# Patient Record
Sex: FEMALE | Race: WHITE | NOT HISPANIC OR LATINO | Employment: FULL TIME | URBAN - METROPOLITAN AREA
[De-identification: names, ages, dates, MRNs, and addresses within clinical notes are randomized per-mention and may not be internally consistent; named-entity substitution may affect disease eponyms.]

---

## 2017-03-06 ENCOUNTER — OFFICE VISIT (OUTPATIENT)
Dept: FAMILY MEDICINE | Facility: CLINIC | Age: 42
End: 2017-03-06
Payer: COMMERCIAL

## 2017-03-06 VITALS
HEIGHT: 64 IN | TEMPERATURE: 98.2 F | HEART RATE: 109 BPM | OXYGEN SATURATION: 96 % | DIASTOLIC BLOOD PRESSURE: 68 MMHG | WEIGHT: 104.8 LBS | SYSTOLIC BLOOD PRESSURE: 118 MMHG | BODY MASS INDEX: 17.89 KG/M2

## 2017-03-06 DIAGNOSIS — G43.829 MENSTRUAL MIGRAINE WITHOUT STATUS MIGRAINOSUS, NOT INTRACTABLE: ICD-10-CM

## 2017-03-06 DIAGNOSIS — M99.08 SOMATIC DYSFUNCTION OF RIB CAGE REGION: ICD-10-CM

## 2017-03-06 DIAGNOSIS — M62.830 BACK MUSCLE SPASM: ICD-10-CM

## 2017-03-06 DIAGNOSIS — J18.9 PNEUMONIA OF BOTH LUNGS DUE TO INFECTIOUS ORGANISM, UNSPECIFIED PART OF LUNG: Primary | ICD-10-CM

## 2017-03-06 PROCEDURE — 99214 OFFICE O/P EST MOD 30 MIN: CPT | Performed by: FAMILY MEDICINE

## 2017-03-06 PROCEDURE — 98925 OSTEOPATH MANJ 1-2 REGIONS: CPT | Performed by: FAMILY MEDICINE

## 2017-03-06 RX ORDER — AZITHROMYCIN 250 MG/1
TABLET, FILM COATED ORAL
Qty: 6 TABLET | Refills: 0 | Status: SHIPPED | OUTPATIENT
Start: 2017-03-06 | End: 2023-02-23

## 2017-03-06 RX ORDER — NORGESTIMATE AND ETHINYL ESTRADIOL 7DAYSX3 LO
1 KIT ORAL DAILY
COMMUNITY
End: 2023-02-23

## 2017-03-06 RX ORDER — FLUCONAZOLE 150 MG/1
150 TABLET ORAL ONCE
Qty: 1 TABLET | Refills: 0 | Status: SHIPPED | OUTPATIENT
Start: 2017-03-06 | End: 2017-03-06

## 2017-03-06 RX ORDER — DEXTROAMPHETAMINE SACCHARATE, AMPHETAMINE ASPARTATE, DEXTROAMPHETAMINE SULFATE AND AMPHETAMINE SULFATE 2.5; 2.5; 2.5; 2.5 MG/1; MG/1; MG/1; MG/1
10 TABLET ORAL DAILY
COMMUNITY

## 2017-03-06 RX ORDER — CYCLOBENZAPRINE HCL 5 MG
5 TABLET ORAL 3 TIMES DAILY PRN
Qty: 20 TABLET | Refills: 1 | Status: SHIPPED | OUTPATIENT
Start: 2017-03-06 | End: 2023-02-23

## 2017-03-06 NOTE — PROGRESS NOTES
SUBJECTIVE:                                                    Emily Armijo is a 42 year old female who presents to clinic today for the following health issues:    ENT Symptoms             Symptoms: cc Present Absent Comment   Fever/Chills   x Low grade at first   Fatigue  x     Muscle Aches x   Pulled muscle the other day   Eye Irritation   x    Sneezing   x    Nasal Favian/Drg  x  drainage   Sinus Pressure/Pain  x     Loss of smell   x    Dental pain   x    Sore Throat   x    Swollen Glands   x    Ear Pain/Fullness   x    Cough x   productive   Wheeze   x    Chest Pain  x  Pulled muscle   Shortness of breath  x     Rash   x    Other  x  headache     Symptom duration:  4 weeks   Symptom severity:  mild- moderate   Treatments tried:  Mucinex at night, tea and water, motrin   Contacts:  , sister had pneumonia     Additional Comments: Head cold for a week which she got over, now respiratory symptoms.   She was coughing and pulled a muscle yesterday. The pain from the pulled muscle is below the breast and goes around to her back. Pain is exacerbated when she lies down and breathes. Patient reports she gets yeast infections with antibiotics.      Migraine with Menstruation  She reports having migraines before period. She takes caffeine and her  massages neck to relieve pain.     Problem list and histories reviewed & adjusted, as indicated.  Additional history: as documented    There is no problem list on file for this patient.    History reviewed. No pertinent past surgical history.    Social History   Substance Use Topics     Smoking status: Never Smoker     Smokeless tobacco: Never Used     Alcohol use No     Family History   Problem Relation Age of Onset     Neurologic Disorder Mother      Hypertension Father      GERD Father      Bone Cancer Maternal Grandmother      Myocardial Infarction Maternal Grandfather      HEART DISEASE Paternal Grandmother          BP Readings from Last 3 Encounters:  "  03/06/17 118/68    Wt Readings from Last 3 Encounters:   03/06/17 104 lb 12.8 oz (47.5 kg)         Reviewed and updated as needed this visit by clinical staff  Tobacco  Allergies  Meds  Problems  Med Hx  Surg Hx  Fam Hx  Soc Hx        Reviewed and updated as needed this visit by Provider  Allergies  Meds  Problems         ROS:  Constitutional, HEENT, cardiovascular, pulmonary, GI, , musculoskeletal, neuro, skin, endocrine and psych systems are negative, except as otherwise noted.    This document serves as a record of the services and decisions personally performed and made by Maine Fitch DO. It was created on her/his behalf by Viki Jose, a trained medical scribe. The creation of this document is based the provider's statements to the medical scribe.  Viki Jose March 6, 2017 11:29 AM    OBJECTIVE:                                                    /68 (BP Location: Right arm, Patient Position: Chair, Cuff Size: Adult Regular)  Pulse 109  Temp 98.2  F (36.8  C) (Oral)  Ht 5' 3.5\" (1.613 m)  Wt 104 lb 12.8 oz (47.5 kg)  LMP 02/27/2017  SpO2 96%  BMI 18.27 kg/m2  Body mass index is 18.27 kg/(m^2).  GENERAL: healthy, alert and no distress  EYES: Eyes grossly normal to inspection, PERRL and conjunctivae and sclerae normal  HENT: ear canals and TM's normal, nose and mouth without ulcers or lesions  NECK: no adenopathy, no asymmetry, masses, or scars and thyroid normal to palpation  RESP: rhonchi throughout, no rales or wheezes  CV: regular rate and rhythm, normal S1 S2, no S3 or S4, no murmur, click or rub, no peripheral edema and peripheral pulses strong  MS: no gross musculoskeletal defects noted, no edema  PSYCH: mentation appears normal, affect normal/bright    Osteopathic Structural Exam  Ribs-- stuck in exhalation, Treatment ME    Diagnostic Test Results:  No results found for this or any previous visit (from the past 24 hour(s)).     ASSESSMENT/PLAN:                                      "                   ICD-10-CM    1. Pneumonia of both lungs due to infectious organism, unspecified part of lung J18.9 azithromycin (ZITHROMAX) 250 MG tablet     fluconazole (DIFLUCAN) 150 MG tablet   2. Back muscle spasm M62.830 cyclobenzaprine (FLEXERIL) 5 MG tablet   3. Menstrual migraine without status migrainosus, not intractable G43.829    4. Somatic dysfunction of rib cage region M99.08 OSTEOPATHIC MANIP,3-4 BODY REGN        I performed OMT to treat muscle pain in ribs. I prescribed patient Zithromax for pneumonia, and Flexeril for muscle pain. I also advised her to do arm stretches for the pain and to take CO Q 10 enzyme to prevent migraines. She will let us know if she is not doing any better.     Patient Instructions   Co Q 10 enzyme to prevent migraines.    Do arm stretches I showed you.    Use Heat Pad.       Maine Fitch DO  [facility]    This document serves as a record of the services and decisions personally performed and made by Maine Fitch DO. It was created on her behalf by Viki Jose, a trained medical scribe. The creation of this document is based the provider's statements to the medical scribe.  Viki Jose March 6, 2017 11:29 AM

## 2017-03-06 NOTE — MR AVS SNAPSHOT
"              After Visit Summary   3/6/2017    Emily Armijo    MRN: [id number]           Patient Information     Date Of Birth          1975        Visit Information        Provider Department      3/6/2017 11:00 AM Maine Fitch,  [facility]        Today's Diagnoses     Pneumonia of both lungs due to infectious organism, unspecified part of lung    -  1    Back muscle spasm        Menstrual migraine without status migrainosus, not intractable          Care Instructions    Co Q 10 enzyme to prevent migraines.    Do arm stretches I showed you.    Use Heat Pad.         Follow-ups after your visit        Who to contact     If you have questions or need follow up information about today's clinic visit or your schedule please contact [facility] directly at [phone].  Normal or non-critical lab and imaging results will be communicated to you by FeZohart, letter or phone within 4 business days after the clinic has received the results. If you do not hear from us within 7 days, please contact the clinic through FeZohart or phone. If you have a critical or abnormal lab result, we will notify you by phone as soon as possible.  Submit refill requests through Game Cooks or call your pharmacy and they will forward the refill request to us. Please allow 3 business days for your refill to be completed.          Additional Information About Your Visit        MyCharInviBox Information     Game Cooks lets you send messages to your doctor, view your test results, renew your prescriptions, schedule appointments and more. To sign up, go to [email or web address] . Click on \"Log in\" on the left side of the screen, which will take you to the Welcome page. Then click on \"Sign up Now\" on the right side of the page.     You will be asked to enter the access code listed below, as well as some personal information. Please follow the directions to create your username and password.     Your access code " "is: [email or web address]  Expires: 2017 11:50 AM     Your access code will  in 90 days. If you need help or a new code, please call your [facility] clinic or [phone].        Care EveryWhere ID     This is your Care EveryWhere ID. This could be used by other organizations to access your [facility] medical records  [id number]        Your Vitals Were     Pulse Temperature Height Last Period Pulse Oximetry BMI (Body Mass Index)    109 98.2  F (36.8  C) (Oral) 5' 3.5\" (1.613 m) 2017 96% 18.27 kg/m2       Blood Pressure from Last 3 Encounters:   17 118/68    Weight from Last 3 Encounters:   17 104 lb 12.8 oz (47.5 kg)              Today, you had the following     No orders found for display         Today's Medication Changes          These changes are accurate as of: 3/6/17 11:50 AM.  If you have any questions, ask your nurse or doctor.               Start taking these medicines.        Dose/Directions    azithromycin 250 MG tablet   Commonly known as:  ZITHROMAX   Used for:  Pneumonia of both lungs due to infectious organism, unspecified part of lung   Started by:  Maine Fitch, DO        Two tablets first day, then one tablet daily for four days.   Quantity:  6 tablet   Refills:  0       cyclobenzaprine 5 MG tablet   Commonly known as:  FLEXERIL   Used for:  Back muscle spasm   Started by:  Maine Fitch DO        Dose:  5 mg   Take 1 tablet (5 mg) by mouth 3 times daily as needed for muscle spasms   Quantity:  20 tablet   Refills:  1       fluconazole 150 MG tablet   Commonly known as:  DIFLUCAN   Used for:  Pneumonia of both lungs due to infectious organism, unspecified part of lung   Started by:  Maine Fitch DO        Dose:  150 mg   Take 1 tablet (150 mg) by mouth once for 1 dose   Quantity:  1 tablet   Refills:  0            Where to get your medicines      These medications were sent to [facility] [address] [place] [address] [address] AT [address] [place] [address] & [address], " [place] [address]    Hours:  24-hours Phone:  [phone]     azithromycin 250 MG tablet    cyclobenzaprine 5 MG tablet    fluconazole 150 MG tablet                Primary Care Provider    None Specified       No primary provider on file.        Thank you!     Thank you for choosing [facility]  for your care. Our goal is always to provide you with excellent care. Hearing back from our patients is one way we can continue to improve our services. Please take a few minutes to complete the written survey that you may receive in the mail after your visit with us. Thank you!             Your Updated Medication List - Protect others around you: Learn how to safely use, store and throw away your medicines at www.disposemymeds.org.          This list is accurate as of: 3/6/17 11:50 AM.  Always use your most recent med list.                   Brand Name Dispense Instructions for use    ADDERALL 10 MG per tablet   Generic drug:  amphetamine-dextroamphetamine      Take 10 mg by mouth daily       azithromycin 250 MG tablet    ZITHROMAX    6 tablet    Two tablets first day, then one tablet daily for four days.       cyclobenzaprine 5 MG tablet    FLEXERIL    20 tablet    Take 1 tablet (5 mg) by mouth 3 times daily as needed for muscle spasms       fluconazole 150 MG tablet    DIFLUCAN    1 tablet    Take 1 tablet (150 mg) by mouth once for 1 dose       ORTHO TRI-CYCLEN LO 0.18/0.215/0.25 MG-25 MCG per tablet   Generic drug:  norgestim-eth estrad triphasic      Take 1 tablet by mouth daily

## 2017-03-06 NOTE — NURSING NOTE
"Chief Complaint   Patient presents with     Cough       Initial /68 (BP Location: Right arm, Patient Position: Chair, Cuff Size: Adult Regular)  Pulse 109  Temp 98.2  F (36.8  C) (Oral)  Ht 5' 3.5\" (1.613 m)  Wt 104 lb 12.8 oz (47.5 kg)  LMP 02/27/2017  SpO2 96%  BMI 18.27 kg/m2 Estimated body mass index is 18.27 kg/(m^2) as calculated from the following:    Height as of this encounter: 5' 3.5\" (1.613 m).    Weight as of this encounter: 104 lb 12.8 oz (47.5 kg).  Medication Reconciliation: complete    "

## 2017-03-16 ENCOUNTER — TELEPHONE (OUTPATIENT)
Dept: NURSING | Facility: CLINIC | Age: 42
End: 2017-03-16

## 2017-03-16 ENCOUNTER — MYC MEDICAL ADVICE (OUTPATIENT)
Dept: FAMILY MEDICINE | Facility: CLINIC | Age: 42
End: 2017-03-16

## 2017-03-31 ENCOUNTER — HOSPITAL ENCOUNTER (OUTPATIENT)
Dept: MAMMOGRAPHY | Facility: CLINIC | Age: 42
Discharge: HOME | End: 2017-03-31
Attending: OBSTETRICS & GYNECOLOGY | Admitting: OBSTETRICS & GYNECOLOGY
Payer: COMMERCIAL

## 2017-03-31 DIAGNOSIS — Z12.31 VISIT FOR SCREENING MAMMOGRAM: ICD-10-CM

## 2017-03-31 PROCEDURE — G0202 SCR MAMMO BI INCL CAD: HCPCS

## 2018-06-20 ENCOUNTER — HOSPITAL ENCOUNTER (OUTPATIENT)
Dept: MAMMOGRAPHY | Facility: CLINIC | Age: 43
Discharge: HOME | End: 2018-06-20
Attending: OBSTETRICS & GYNECOLOGY | Admitting: OBSTETRICS & GYNECOLOGY
Payer: COMMERCIAL

## 2018-06-20 DIAGNOSIS — Z12.31 VISIT FOR SCREENING MAMMOGRAM: ICD-10-CM

## 2018-06-20 PROCEDURE — 77067 SCR MAMMO BI INCL CAD: CPT

## 2019-06-26 ENCOUNTER — HOSPITAL ENCOUNTER (OUTPATIENT)
Dept: MAMMOGRAPHY | Facility: CLINIC | Age: 44
Discharge: HOME | End: 2019-06-26
Attending: OBSTETRICS & GYNECOLOGY | Admitting: OBSTETRICS & GYNECOLOGY
Payer: COMMERCIAL

## 2019-06-26 DIAGNOSIS — Z12.31 VISIT FOR SCREENING MAMMOGRAM: ICD-10-CM

## 2019-06-26 PROCEDURE — 77063 BREAST TOMOSYNTHESIS BI: CPT

## 2019-10-02 ENCOUNTER — HEALTH MAINTENANCE LETTER (OUTPATIENT)
Age: 44
End: 2019-10-02

## 2020-03-22 ENCOUNTER — HEALTH MAINTENANCE LETTER (OUTPATIENT)
Age: 45
End: 2020-03-22

## 2021-01-15 ENCOUNTER — HEALTH MAINTENANCE LETTER (OUTPATIENT)
Age: 46
End: 2021-01-15

## 2021-09-04 ENCOUNTER — HEALTH MAINTENANCE LETTER (OUTPATIENT)
Age: 46
End: 2021-09-04

## 2021-09-15 ENCOUNTER — HOSPITAL ENCOUNTER (OUTPATIENT)
Dept: MAMMOGRAPHY | Facility: CLINIC | Age: 46
Discharge: HOME | End: 2021-09-15
Attending: OBSTETRICS & GYNECOLOGY | Admitting: OBSTETRICS & GYNECOLOGY
Payer: COMMERCIAL

## 2021-09-15 DIAGNOSIS — Z12.31 VISIT FOR SCREENING MAMMOGRAM: ICD-10-CM

## 2021-09-15 PROCEDURE — 77063 BREAST TOMOSYNTHESIS BI: CPT

## 2021-10-30 ENCOUNTER — HEALTH MAINTENANCE LETTER (OUTPATIENT)
Age: 46
End: 2021-10-30

## 2022-10-22 ENCOUNTER — HEALTH MAINTENANCE LETTER (OUTPATIENT)
Age: 47
End: 2022-10-22

## 2022-12-04 ENCOUNTER — HEALTH MAINTENANCE LETTER (OUTPATIENT)
Age: 47
End: 2022-12-04

## 2022-12-27 ENCOUNTER — HOSPITAL ENCOUNTER (OUTPATIENT)
Dept: MAMMOGRAPHY | Facility: CLINIC | Age: 47
Discharge: HOME | End: 2022-12-27
Attending: OBSTETRICS & GYNECOLOGY | Admitting: OBSTETRICS & GYNECOLOGY
Payer: COMMERCIAL

## 2022-12-27 DIAGNOSIS — Z12.31 VISIT FOR SCREENING MAMMOGRAM: ICD-10-CM

## 2022-12-27 PROCEDURE — 77067 SCR MAMMO BI INCL CAD: CPT

## 2023-02-23 ENCOUNTER — TELEPHONE (OUTPATIENT)
Dept: NEUROLOGY | Facility: CLINIC | Age: 48
End: 2023-02-23

## 2023-02-23 ENCOUNTER — VIRTUAL VISIT (OUTPATIENT)
Dept: NEUROLOGY | Facility: CLINIC | Age: 48
End: 2023-02-23
Payer: COMMERCIAL

## 2023-02-23 DIAGNOSIS — G43.109 MIGRAINE WITH AURA AND WITHOUT STATUS MIGRAINOSUS, NOT INTRACTABLE: Primary | ICD-10-CM

## 2023-02-23 PROCEDURE — 99204 OFFICE O/P NEW MOD 45 MIN: CPT | Mod: VID | Performed by: PSYCHIATRY & NEUROLOGY

## 2023-02-23 RX ORDER — RIZATRIPTAN BENZOATE 10 MG/1
10 TABLET ORAL
Qty: 18 TABLET | Refills: 11 | Status: SHIPPED | OUTPATIENT
Start: 2023-02-23

## 2023-02-23 RX ORDER — ONDANSETRON 4 MG/1
4 TABLET, ORALLY DISINTEGRATING ORAL EVERY 8 HOURS PRN
Qty: 10 TABLET | Refills: 11 | Status: SHIPPED | OUTPATIENT
Start: 2023-02-23

## 2023-02-23 ASSESSMENT — HEADACHE IMPACT TEST (HIT 6)
HOW OFTEN DID HEADACHS LIMIT CONCENTRATION ON WORK OR DAILY ACTIVITY: VERY OFTEN
HOW OFTEN HAVE YOU FELT TOO TIRED TO WORK BECAUSE OF YOUR HEADACHES: NEVER
WHEN YOU HAVE HEADACHES HOW OFTEN IS THE PAIN SEVERE: VERY OFTEN
WHEN YOU HAVE A HEADACHE HOW OFTEN DO YOU WISH YOU COULD LIE DOWN: VERY OFTEN
HOW OFTEN DO HEADACHES LIMIT YOUR DAILY ACTIVITIES: SOMETIMES
HIT6 TOTAL SCORE: 60
WHEN YOU HAVE HEADACHES HOW OFTEN IS THE PAIN SEVERE: VERY OFTEN
HIT6 TOTAL SCORE: 60
HOW OFTEN HAVE YOU FELT FED UP OR IRRITATED BECAUSE OF YOUR HEADACHES: VERY OFTEN
HOW OFTEN DO HEADACHES LIMIT YOUR DAILY ACTIVITIES: SOMETIMES
HOW OFTEN DID HEADACHS LIMIT CONCENTRATION ON WORK OR DAILY ACTIVITY: VERY OFTEN
HOW OFTEN HAVE YOU FELT TOO TIRED TO WORK BECAUSE OF YOUR HEADACHES: NEVER
HOW OFTEN HAVE YOU FELT FED UP OR IRRITATED BECAUSE OF YOUR HEADACHES: VERY OFTEN
WHEN YOU HAVE A HEADACHE HOW OFTEN DO YOU WISH YOU COULD LIE DOWN: VERY OFTEN

## 2023-02-23 ASSESSMENT — MIGRAINE DISABILITY ASSESSMENT (MIDAS)
HOW MANY DAYS WAS HOUSEWORK PRODUCTIVITY CUT IN HALF DUE TO HEADACHES: 0
ON A SCALE FROM 0-10 ON AVERAGE HOW PAINFUL WERE HEADACHES: 9
HOW MANY DAYS DID YOU MISS WORK OR SCHOOL BECAUSE OF HEADACHES: 0
TOTAL SCORE: 13
HOW OFTEN WERE SOCIAL ACTIVITIES MISSED DUE TO HEADACHES: 3
HOW MANY DAYS DID YOU NOT DO HOUSEWORK BECAUSE OF HEADACHES: 10
HOW MANY DAYS IN THE PAST 3 MONTHS HAVE YOU HAD A HEADACHE: 10
HOW MANY DAYS WAS YOUR PRODUCTIVITY CUT IN HALF BECAUSE OF HEADACHES: 0
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
## 2023-02-23 NOTE — PROGRESS NOTES
Video-Visit Details    Type of service:  Video Visit    Video Start Time (time video started): 12:31pm    Video End Time (time video stopped): 1:22 pm    Originating Location (pt. Location): Home    Distant Location (provider location):  Off-site    Mode of Communication:  Video Conference via [facility]

## 2023-02-23 NOTE — NURSING NOTE
Is the patient currently in the state of [place]? YES    Visit mode:VIDEO    If the visit is dropped, the patient can be reconnected by: VIDEO VISIT: Text to cell phone: [phone]    Will anyone else be joining the visit? NO      How would you like to obtain your AVS? MyChart    Are changes needed to the allergy or medication list? NO    Reason for visit: headache

## 2023-04-01 ENCOUNTER — HEALTH MAINTENANCE LETTER (OUTPATIENT)
Age: 48
End: 2023-04-01

## 2024-03-05 ENCOUNTER — HOSPITAL ENCOUNTER (OUTPATIENT)
Dept: MAMMOGRAPHY | Facility: CLINIC | Age: 49
Discharge: HOME | End: 2024-03-05
Attending: OBSTETRICS & GYNECOLOGY | Admitting: OBSTETRICS & GYNECOLOGY
Payer: COMMERCIAL

## 2024-03-05 DIAGNOSIS — Z12.31 VISIT FOR SCREENING MAMMOGRAM: ICD-10-CM

## 2024-03-05 PROCEDURE — 77063 BREAST TOMOSYNTHESIS BI: CPT

## 2024-03-24 ENCOUNTER — HEALTH MAINTENANCE LETTER (OUTPATIENT)
Age: 49
End: 2024-03-24

## 2025-01-22 ENCOUNTER — VIRTUAL VISIT (OUTPATIENT)
Dept: NEUROLOGY | Facility: CLINIC | Age: 50
End: 2025-01-22
Payer: COMMERCIAL

## 2025-01-22 VITALS — BODY MASS INDEX: 18.61 KG/M2 | WEIGHT: 105 LBS | HEIGHT: 63 IN

## 2025-01-22 DIAGNOSIS — G43.109 MIGRAINE WITH AURA AND WITHOUT STATUS MIGRAINOSUS, NOT INTRACTABLE: ICD-10-CM

## 2025-01-22 RX ORDER — ONDANSETRON 4 MG/1
4 TABLET, ORALLY DISINTEGRATING ORAL EVERY 8 HOURS PRN
Qty: 10 TABLET | Refills: 11 | Status: SHIPPED | OUTPATIENT
Start: 2025-01-22

## 2025-01-22 RX ORDER — RIZATRIPTAN BENZOATE 10 MG/1
10 TABLET ORAL
Qty: 18 TABLET | Refills: 11 | Status: SHIPPED | OUTPATIENT
Start: 2025-01-22

## 2025-01-22 ASSESSMENT — HEADACHE IMPACT TEST (HIT 6)
HOW OFTEN DID HEADACHS LIMIT CONCENTRATION ON WORK OR DAILY ACTIVITY: SOMETIMES
HOW OFTEN HAVE YOU FELT TOO TIRED TO WORK BECAUSE OF YOUR HEADACHES: SOMETIMES
HOW OFTEN HAVE YOU FELT FED UP OR IRRITATED BECAUSE OF YOUR HEADACHES: SOMETIMES
WHEN YOU HAVE HEADACHES HOW OFTEN IS THE PAIN SEVERE: ALWAYS
HIT6 TOTAL SCORE: 66
WHEN YOU HAVE A HEADACHE HOW OFTEN DO YOU WISH YOU COULD LIE DOWN: ALWAYS
HOW OFTEN DO HEADACHES LIMIT YOUR DAILY ACTIVITIES: SOMETIMES

## 2025-01-22 ASSESSMENT — PAIN SCALES - GENERAL: PAINLEVEL_OUTOF10: NO PAIN (0)

## 2025-01-22 ASSESSMENT — MIGRAINE DISABILITY ASSESSMENT (MIDAS)
HOW MANY DAYS DID YOU MISS WORK OR SCHOOL BECAUSE OF HEADACHES: 0
HOW MANY DAYS WAS HOUSEWORK PRODUCTIVITY CUT IN HALF DUE TO HEADACHES: 3
HOW OFTEN WERE SOCIAL ACTIVITIES MISSED DUE TO HEADACHES: 1
HOW MANY DAYS IN THE PAST 3 MONTHS HAVE YOU HAD A HEADACHE: 8
HOW MANY DAYS DID YOU NOT DO HOUSEWORK BECAUSE OF HEADACHES: 2
TOTAL SCORE: 12
HOW MANY DAYS WAS YOUR PRODUCTIVITY CUT IN HALF BECAUSE OF HEADACHES: 6
ON A SCALE FROM 0-10 ON AVERAGE HOW PAINFUL WERE HEADACHES: 8

## 2025-01-22 NOTE — PROGRESS NOTES
Virtual Visit Details    Type of service:  Video Visit     Originating Location (pt. Location): Home    Distant Location (provider location):  Off-site  Platform used for Video Visit: Barbara

## 2025-01-22 NOTE — LETTER
1/22/2025       RE: Emily Armijo  [address]  [facility] [address]     Dear Colleague,    Thank you for referring your patient, Emily Armijo, to the [facility] [place] at [facility]. Please see a copy of my visit note below.    [facility]    Headache Neurology Progress Note  January 22, 2025      Assessment/Plan:   Emily Armijo is a 50 year old who returns for follow up of episodic migraine with rare visual aura.  I suspect she has this on a genetic basis, although cannot completely rule out contribution from head trauma occurring before the onset of migraine attacks in her early 20s. Headaches remain associated with menstrual cycles, and she is starting to get attacks outside of this too, possibly due to perimenopause.     We discussed a symptomatic treatment strategy, focusing on acute treatment options.  -For acute treatment of mild headache, she may try over-the-counter NSAIDs, such as ibuprofen 400 to 600 mg, or naproxen 440 mg as needed, not to exceed more than 14 days/month to avoid medication overuse.  -For acute treatment of moderate to severe headache, I recommend rizatriptan 10 mg taken at the onset of headache, with a repeat dose in 2 hours if needed.  This should not exceed more than 9 days/month to avoid medication overuse.  -For acute treatment of headache related nausea, I recommend ondansetron 4 mg oral dissolvable tablet as needed.     We discussed perimenopause and what to expect regarding headache and triggers.     The longitudinal plan of care for Emily was addressed during this visit. Due to the added complexity in care, I will continue to support Emily in the subsequent management of this condition(s) and with the ongoing continuity of care of this condition(s). I will see her back in 1 year, or sooner if needed.    Doris Rasmussen MD  Neurology     Subjective:    Emily Armijo  "returns for follow up of episodic migraine with rare aura.    Today, she report she still gets headaches monthly, and sometimes other days too. About 3-4 days per month. Possibly in perimenopause now.    No change in headache quality.    She takes rizatriptan 10 mg x 2, and sometimes a third dose the next day. Urinates frequently and can be loopy at times with rizatriptan.    Also uses a nausea medication sometimes. She takes ondansetron ODT as needed.    No other health changes reported.    Objective:    Vitals: Ht 1.6 m (5' 3\")   Wt 47.6 kg (105 lb)   BMI 18.60 kg/m    General: Cooperative, NAD  Neurologic:  Mental Status: Fully alert, attentive and oriented. Speech clear and fluent.   Cranial Nerves: Facial movements symmetric.   Motor: No abnormal movements.      Pertinent Investigations:          2/23/2023    12:21 PM 1/22/2025     8:05 AM   HIT-6   When you have headaches, how often is the pain severe 11  13   How often do headaches limit your ability to do usual daily activities including household work, work, school, or social activities? 10  10   When you have a headache, how often do you wish you could lie down? 11  13   In the past 4 weeks, how often have you felt too tired to do work or daily activities because of your headaches 6  10   In the past 4 weeks, how often have you felt fed up or irritated because of your headaches 11  10   In the past 4 weeks, how often did headaches limit your ability to concentrate on work or daily activities 11  10   HIT-6 Total Score 60 66        Patient-reported    Proxy-reported           2/23/2023    12:23 PM 1/22/2025     8:09 AM   MIDAS - in the past three months:   On how many days did you miss work or school because of your headaches? 0 0   How many days was your productivity at work or school reduced by half or more because of your headaches? 0 6   On how many days did you not do household work because of your headaches? 10 2   How many days was your " productivity in household work reduced by half or more because of your headaches? 0 3   On how many days did you miss family, social, or leisure activities because of your headaches? 3 1   On how many days did you have a headache? 10 8   On a scale of 0-10, on average how painful were these headaches? 9 8   MIDAS Score 13 (III - Moderate Disability) 12 (III - Moderate Disability)          Virtual Visit Details    Type of service:  Video Visit     Originating Location (pt. Location): Home    Distant Location (provider location):  Off-site  Platform used for Video Visit: AmWell      Again, thank you for allowing me to participate in the care of your patient.      Sincerely,    Doris Rasmussen MD

## 2025-01-22 NOTE — NURSING NOTE
Current patient location: [address] [place] [id number]    Is the patient currently in the state of [place]? YES    Visit mode: VIDEO    If the visit is dropped, the patient can be reconnected by:VIDEO VISIT: Text to cell phone:   Telephone Information:   Mobile [phone]       Will anyone else be joining the visit? NO  (If patient encounters technical issues they should call [phone])    Are changes needed to the allergy or medication list? Pt stated no changes to allergies and Pt stated no med changes    Are refills needed on medications prescribed by this physician? NO    Rooming Documentation:  Questionnaire(s) completed    Reason for visit: SHEFALI CRAIGF

## 2025-01-22 NOTE — PROGRESS NOTES
[facility]    Headache Neurology Progress Note  January 22, 2025      Assessment/Plan:   Emily Armijo is a 50 year old who returns for follow up of episodic migraine with rare visual aura.  I suspect she has this on a genetic basis, although cannot completely rule out contribution from head trauma occurring before the onset of migraine attacks in her early 20s. Headaches remain associated with menstrual cycles, and she is starting to get attacks outside of this too, possibly due to perimenopause.     We discussed a symptomatic treatment strategy, focusing on acute treatment options.  -For acute treatment of mild headache, she may try over-the-counter NSAIDs, such as ibuprofen 400 to 600 mg, or naproxen 440 mg as needed, not to exceed more than 14 days/month to avoid medication overuse.  -For acute treatment of moderate to severe headache, I recommend rizatriptan 10 mg taken at the onset of headache, with a repeat dose in 2 hours if needed.  This should not exceed more than 9 days/month to avoid medication overuse.  -For acute treatment of headache related nausea, I recommend ondansetron 4 mg oral dissolvable tablet as needed.     We discussed perimenopause and what to expect regarding headache and triggers.     The longitudinal plan of care for Emily was addressed during this visit. Due to the added complexity in care, I will continue to support Emily in the subsequent management of this condition(s) and with the ongoing continuity of care of this condition(s). I will see her back in 1 year, or sooner if needed.    Doris Rasmussen MD  Neurology     Subjective:    Emily Armijo returns for follow up of episodic migraine with rare aura.    Today, she report she still gets headaches monthly, and sometimes other days too. About 3-4 days per month. Possibly in perimenopause now.    No change in headache quality.    She takes rizatriptan 10 mg x 2, and sometimes a third dose the next day.  "Urinates frequently and can be loopy at times with rizatriptan.    Also uses a nausea medication sometimes. She takes ondansetron ODT as needed.    No other health changes reported.    Objective:    Vitals: Ht 1.6 m (5' 3\")   Wt 47.6 kg (105 lb)   BMI 18.60 kg/m    General: Cooperative, NAD  Neurologic:  Mental Status: Fully alert, attentive and oriented. Speech clear and fluent.   Cranial Nerves: Facial movements symmetric.   Motor: No abnormal movements.      Pertinent Investigations:          2/23/2023    12:21 PM 1/22/2025     8:05 AM   HIT-6   When you have headaches, how often is the pain severe 11  13   How often do headaches limit your ability to do usual daily activities including household work, work, school, or social activities? 10  10   When you have a headache, how often do you wish you could lie down? 11  13   In the past 4 weeks, how often have you felt too tired to do work or daily activities because of your headaches 6  10   In the past 4 weeks, how often have you felt fed up or irritated because of your headaches 11  10   In the past 4 weeks, how often did headaches limit your ability to concentrate on work or daily activities 11  10   HIT-6 Total Score 60 66        Patient-reported    Proxy-reported           2/23/2023    12:23 PM 1/22/2025     8:09 AM   MIDAS - in the past three months:   On how many days did you miss work or school because of your headaches? 0 0   How many days was your productivity at work or school reduced by half or more because of your headaches? 0 6   On how many days did you not do household work because of your headaches? 10 2   How many days was your productivity in household work reduced by half or more because of your headaches? 0 3   On how many days did you miss family, social, or leisure activities because of your headaches? 3 1   On how many days did you have a headache? 10 8   On a scale of 0-10, on average how painful were these headaches? 9 8   MIDAS Score 13 " (III - Moderate Disability) 12 (III - Moderate Disability)

## 2025-04-12 ENCOUNTER — HEALTH MAINTENANCE LETTER (OUTPATIENT)
Age: 50
End: 2025-04-12

## 2025-05-08 ENCOUNTER — TELEPHONE (OUTPATIENT)
Dept: GASTROENTEROLOGY | Facility: CLINIC | Age: 50
End: 2025-05-08
Payer: COMMERCIAL

## 2025-05-08 NOTE — TELEPHONE ENCOUNTER
"Pt had some questions for their doctor and will call back to finish scheduling.    Endoscopy Scheduling Screen    Caller: patient    Have you had any respiratory illness or flu-like symptoms in the last 10 days?  No    What is your communication preference for Instructions and/or Bowel Prep?   MyChart    What insurance is in the chart?  Other:  [employer]    Ordering/Referring Provider: ADONIS AL    (If ordering provider performs procedure, schedule with ordering provider unless otherwise instructed. )    BMI: Estimated body mass index is 18.6 kg/m  as calculated from the following:    Height as of 1/22/25: 1.6 m (5' 3\").    Weight as of 1/22/25: 47.6 kg (105 lb).     Sedation Ordered  moderate sedation.   If patient BMI > 50 do not schedule in [facility].    If patient BMI > 45 do not schedule at [facility].    Are you taking methadone or Suboxone?  NO, No RN review required.    Have you been diagnosed and are being treated for severe PTSD or severe anxiety?  NO, No RN review required.    Are you taking any prescription medications for pain 3 or more times per week?   NO, No RN review required.    Do you have a history of malignant hyperthermia?  No    (Females) Are you currently pregnant?   No     Have you been diagnosed or told you have pulmonary hypertension?   No    Do you have an LVAD?  No    Have you been told you have moderate to severe sleep apnea?  No.    Have you been told you have COPD, asthma, or any other lung disease?  No    Has your doctor ordered any cardiac tests like echo, angiogram, stress test, ablation, or EKG, that you have not completed yet?  No    Do you  have a history of any heart conditions?  No     Have you ever had or are you waiting for an organ transplant?  No. Continue scheduling, no site restrictions.    Have you had a stroke or transient ischemic attack (TIA aka \"mini stroke\") in the last 2 years?   No.    Have you been diagnosed with or been told you have cirrhosis of the liver?   No.    Are " "you currently on dialysis?   No    Do you need assistance transferring?   No    BMI: Estimated body mass index is 18.6 kg/m  as calculated from the following:    Height as of 1/22/25: 1.6 m (5' 3\").    Weight as of 1/22/25: 47.6 kg (105 lb).     Is patients BMI > 40 and scheduling location [facility]?  No    Do you take an injectable or oral medication for weight loss or diabetes (excluding insulin)?  No    Do you take the medication Naltrexone?  No    Do you take blood thinners?  No       Prep   Are you currently on dialysis or do you have chronic kidney disease?  No    Do you have a diagnosis of diabetes?  No    Do you have a diagnosis of cystic fibrosis (CF)?  No    On a regular basis do you go 3 -5 days between bowel movements?  No    BMI > 40?  No    Preferred Pharmacy:    [facility]/pharmacy #[id number] - [place] - [id number]  [address] [place] [address]  Phone: [phone] Fax: [phone]      Final Scheduling Details     Procedure scheduled  Colonoscopy    "

## 2025-06-17 ENCOUNTER — ANCILLARY PROCEDURE (OUTPATIENT)
Dept: MAMMOGRAPHY | Facility: CLINIC | Age: 50
End: 2025-06-17
Attending: OBSTETRICS & GYNECOLOGY
Payer: COMMERCIAL

## 2025-06-17 DIAGNOSIS — Z12.31 VISIT FOR SCREENING MAMMOGRAM: ICD-10-CM

## 2025-06-17 PROCEDURE — 77067 SCR MAMMO BI INCL CAD: CPT | Performed by: RADIOLOGY

## 2025-06-17 PROCEDURE — 77063 BREAST TOMOSYNTHESIS BI: CPT | Performed by: RADIOLOGY

## 2025-06-25 ENCOUNTER — HOSPITAL ENCOUNTER (OUTPATIENT)
Dept: MAMMOGRAPHY | Facility: CLINIC | Age: 50
Discharge: HOME | End: 2025-06-25
Attending: OBSTETRICS & GYNECOLOGY
Payer: COMMERCIAL

## 2025-06-25 DIAGNOSIS — R92.8 ABNORMAL MAMMOGRAM: ICD-10-CM

## 2025-06-25 PROCEDURE — 76642 ULTRASOUND BREAST LIMITED: CPT | Mod: RT

## 2025-06-25 PROCEDURE — 77065 DX MAMMO INCL CAD UNI: CPT | Mod: RT
